# Patient Record
Sex: MALE | Race: WHITE | NOT HISPANIC OR LATINO
[De-identification: names, ages, dates, MRNs, and addresses within clinical notes are randomized per-mention and may not be internally consistent; named-entity substitution may affect disease eponyms.]

---

## 2017-09-12 ENCOUNTER — TRANSCRIPTION ENCOUNTER (OUTPATIENT)
Age: 52
End: 2017-09-12

## 2021-03-18 ENCOUNTER — OUTPATIENT (OUTPATIENT)
Dept: OUTPATIENT SERVICES | Facility: HOSPITAL | Age: 56
LOS: 1 days | End: 2021-03-18
Payer: COMMERCIAL

## 2021-03-18 DIAGNOSIS — Z20.828 CONTACT WITH AND (SUSPECTED) EXPOSURE TO OTHER VIRAL COMMUNICABLE DISEASES: ICD-10-CM

## 2021-03-18 LAB — SARS-COV-2 RNA SPEC QL NAA+PROBE: SIGNIFICANT CHANGE UP

## 2021-03-18 PROCEDURE — U0005: CPT

## 2021-03-18 PROCEDURE — C9803: CPT

## 2021-03-18 PROCEDURE — U0003: CPT

## 2021-03-19 DIAGNOSIS — Z20.828 CONTACT WITH AND (SUSPECTED) EXPOSURE TO OTHER VIRAL COMMUNICABLE DISEASES: ICD-10-CM

## 2021-10-15 ENCOUNTER — OUTPATIENT (OUTPATIENT)
Dept: OUTPATIENT SERVICES | Facility: HOSPITAL | Age: 56
LOS: 1 days | End: 2021-10-15
Payer: COMMERCIAL

## 2021-10-15 PROCEDURE — 93010 ELECTROCARDIOGRAM REPORT: CPT

## 2021-10-20 DIAGNOSIS — Z01.818 ENCOUNTER FOR OTHER PREPROCEDURAL EXAMINATION: ICD-10-CM

## 2021-10-20 PROBLEM — Z00.00 ENCOUNTER FOR PREVENTIVE HEALTH EXAMINATION: Status: ACTIVE | Noted: 2021-10-20

## 2021-10-22 ENCOUNTER — APPOINTMENT (OUTPATIENT)
Dept: DISASTER EMERGENCY | Facility: CLINIC | Age: 56
End: 2021-10-22

## 2021-10-23 LAB — SARS-COV-2 N GENE NPH QL NAA+PROBE: NOT DETECTED

## 2021-10-25 ENCOUNTER — OUTPATIENT (OUTPATIENT)
Dept: OUTPATIENT SERVICES | Facility: HOSPITAL | Age: 56
LOS: 1 days | End: 2021-10-25

## 2021-10-26 ENCOUNTER — OUTPATIENT (OUTPATIENT)
Dept: OUTPATIENT SERVICES | Facility: HOSPITAL | Age: 56
LOS: 1 days | End: 2021-10-26

## 2022-02-04 DIAGNOSIS — Z96.641 PRESENCE OF RIGHT ARTIFICIAL HIP JOINT: ICD-10-CM

## 2022-02-04 DIAGNOSIS — K44.9 DIAPHRAGMATIC HERNIA WITHOUT OBSTRUCTION OR GANGRENE: ICD-10-CM

## 2023-08-03 ENCOUNTER — EMERGENCY (EMERGENCY)
Facility: HOSPITAL | Age: 58
LOS: 1 days | Discharge: HOME | End: 2023-08-03
Attending: STUDENT IN AN ORGANIZED HEALTH CARE EDUCATION/TRAINING PROGRAM
Payer: COMMERCIAL

## 2023-08-03 ENCOUNTER — APPOINTMENT (OUTPATIENT)
Dept: ORTHOPEDIC SURGERY | Facility: CLINIC | Age: 58
End: 2023-08-03
Payer: COMMERCIAL

## 2023-08-03 ENCOUNTER — APPOINTMENT (OUTPATIENT)
Dept: MRI IMAGING | Facility: CLINIC | Age: 58
End: 2023-08-03
Payer: COMMERCIAL

## 2023-08-03 VITALS — HEIGHT: 76 IN | BODY MASS INDEX: 27.52 KG/M2 | WEIGHT: 226 LBS

## 2023-08-03 VITALS
WEIGHT: 227.96 LBS | HEART RATE: 79 BPM | TEMPERATURE: 98 F | HEIGHT: 76 IN | SYSTOLIC BLOOD PRESSURE: 124 MMHG | RESPIRATION RATE: 18 BRPM | DIASTOLIC BLOOD PRESSURE: 74 MMHG | OXYGEN SATURATION: 95 %

## 2023-08-03 DIAGNOSIS — M79.18 MYALGIA, OTHER SITE: ICD-10-CM

## 2023-08-03 DIAGNOSIS — Z87.19 PERSONAL HISTORY OF OTHER DISEASES OF THE DIGESTIVE SYSTEM: ICD-10-CM

## 2023-08-03 PROCEDURE — 73221 MRI JOINT UPR EXTREM W/O DYE: CPT | Mod: RT

## 2023-08-03 PROCEDURE — 99283 EMERGENCY DEPT VISIT LOW MDM: CPT

## 2023-08-03 PROCEDURE — 99204 OFFICE O/P NEW MOD 45 MIN: CPT

## 2023-08-03 PROCEDURE — 73080 X-RAY EXAM OF ELBOW: CPT | Mod: RT

## 2023-08-03 PROCEDURE — 99282 EMERGENCY DEPT VISIT SF MDM: CPT

## 2023-08-03 RX ORDER — NAPROXEN 500 MG/1
500 TABLET ORAL TWICE DAILY
Qty: 60 | Refills: 0 | Status: ACTIVE | COMMUNITY
Start: 2023-08-03 | End: 1900-01-01

## 2023-08-03 NOTE — ED PROVIDER NOTE - ATTENDING APP SHARED VISIT CONTRIBUTION OF CARE
[facility] Emergency Department  [address], [place] [address]  [phone]     Clinical Summary     PERSON INFORMATION   Name DAVID TRONCOSO Age  56 Years  1961 12:00 AM   Acct# [id number] Sex Male Phone [phone]   Dispo Type Still a patient - 30 Arrival 10/30/2017 8:33 PM Checkout 10/30/2017 10:59 PM    Address: [address] [place] [id number]      Visit Reason CHEST PAIN     ED Physician Note     ED Time Seen By Provider Entered On:  10/30/2017 20:42     Performed On:  10/30/2017 20:42  by Mir Mcnair MD      Time Seen By Provider   Time Seen by Provider :   10/30/2017 20:42    Mir Mcnair MD - 10/30/2017 20:42           VITALS INFORMATION  Vitals/Ht/Wt  Temperature Oral:  97.5 F  Peripheral Pulse Rate:  80 bpm  Respiratory Rate:  20 br/min  Oxygen Saturation:  98 %  Systolic Blood Pressure:  106 mmHg  Diastolic Blood Pressure:  70 mmHg  Mean Arterial Pressure:  82 mmHg  Height:  180 cm  Weight:  95.7 kg  ED Hand-Off Communication  ED Hand-Off Reason:  In Hospital Transfer  ED Hand-Off Reason / In Hospital:  SBAR reviewed during report  Patient on Cardiac Monitor:  Yes  Sitter Present:  No  Cardiac Monitor Box:  [id number]  Potential Core Measure:  N/A  Hand-off Report Given to:  Jono Sharma       MEDICAL INFORMATION   Allergy Info:          NKA             Prescriptions:              DISCHARGE INFORMATION   Discharge Disposition: Still a patient - 30     PATIENT EDUCATION INFORMATION   Instructions:          Follow up:              DIAGNOSIS           59 yo male PMHx Kang's esophagus presents to ED c/o right bicep tear x2 nights ago, referred here by a PMD who wanted MR and emergency surgery for patient 2 days s/p injury; given sling and orthopedics referral given there is no indication for emergency MR or surgery.

## 2023-08-03 NOTE — HISTORY OF PRESENT ILLNESS
[de-identified] : 58 year old male  (RHD ) right bicep injury 8/1/23 while playing volleyball. Pt went to [facility] and [facility] 8/3/23  The pain is located deep anterior elbow The pain is associated with throbbing  Worse with activity and better at rest. Has tried sling

## 2023-08-03 NOTE — ED PROVIDER NOTE - CLINICAL SUMMARY MEDICAL DECISION MAKING FREE TEXT BOX
57 yo male PMHx Kang's esophagus presents to ED c/o right bicep tear x2 nights ago. Sent in by PMD. No obvious biceps bulge. NVIT. Sling applied. Clinical coordinator contacted. 59 yo male PMHx Kang's esophagus presents to ED c/o right bicep tear x2 nights ago. Sent in by PMD. No obvious biceps bulge. NVIT. Sling applied. Declined pain medication. Clinical coordinator contacted. Medically stable for discharge.

## 2023-08-03 NOTE — ASSESSMENT
[FreeTextEntry1] : Right  X-Ray Examination of the ELBOW (3 views): there are no fractures, subluxations or dislocations.  Due to the patients injury along with weakness and exam consistent with distal bicep tear , we will get an mri to eval for integrity of the distal bicep tendon   - The patient was advised of the diagnosis.  The natural history of the pathology was explained to the patient in layman's terms.  Several different treatment options were discussed and explained including the risks and benefits of both surgical and non-surgical treatments. - The patient was advised to apply ice (wrapped in a towel or protective covering) to the area daily (20 minutes at a time, 2-4X/day). - Naprosyn rx - Patient was given a prescription for an anti-inflammatory medication.  They will take it for the next week and then on an as needed basis, as long as there are no medical contra-indications.  Patient is counseled on possible GI, renal, and cardiovascular side effects. - The patient was advised to modify their activities. - follow up with elbow specialist

## 2023-08-03 NOTE — ED PROVIDER NOTE - CARE PROVIDER_API CALL
Aidee Parker  Orthopaedic Surgery  [address] [place] [address]  Phone: [phone]  Fax: [phone]  Follow Up Time:

## 2023-08-03 NOTE — ED PROVIDER NOTE - OBJECTIVE STATEMENT
59 yo male PMHx Kang's esophagus presents to ED c/o right bicep tear x2 nights ago. Presented to PMD today who instructed patient to present to ED for surgery. [place]. Has Rx given by PMD for MRI. Did not self medicate PTA. No further complaints at this time. 59 yo male PMHx Kang's esophagus presents to ED c/o right bicep tear x2 nights ago. Felt tear while playing volleyball. Presented to PMD today who instructed patient to present to ED for surgery. Patient leaves for [place] country next week. Has Rx given by PMD for MRI. Did not self medicate PTA. No further complaints at this time.

## 2023-08-03 NOTE — ED PROVIDER NOTE - PHYSICAL EXAMINATION
Gen: Nontoxic, well appearing, in NAD.  Skin: Warm and dry as visualized.  Head: NC/AT.  Eyes: PERRLA. EOMI.  Neck: Supple, FROM. Trachea midline.   Resp: No distress.  Cardio: Well perfused.  PV: 2+ radial pulses.   Ext: No deformities. No bicep bulge. Can flex and extend and elbow.   Neuro: A&Ox3. Sensation intact.   Psych: Normal affect and mood.

## 2023-08-03 NOTE — ED PROVIDER NOTE - NSFOLLOWUPINSTRUCTIONS_ED_ALL_ED_FT
- Ibuprofen 600mg every 6 hours as needed for pain.  - Acetaminophen 650mg every 6 hours as needed for pain.   - Please bring all documentation from your ED visit to any related future follow up appointment.  - Please call to schedule follow up appointment with your primary care physician within 24-48 hours.  - Please seek immediate medical attention or return to the ED for any new/worsening, signs/symptoms, or concerns.    Feel better!     Distal Biceps Tendon Tear       The distal biceps tendon is a strong cord of tissue that connects the biceps muscle—which is the muscle on the front of the upper arm—to a bone (radius) in the elbow. Distal biceps tendon tear is an injury that may include either of the following:    A complete tear (rupture) in the tendon, causing the tendon to completely separate from the radius. When this happens, the biceps muscle pulls up (retracts) toward the shoulder.  A partial tear in the tendon that causes the tendon to partially separate from the radius.    This condition can interfere with your ability to turn your hand palm-up (supination) and bend (flex) your elbow. It is often treated with surgery, and recovery may take 4–8 months.    What are the causes?  This condition is usually caused by suddenly straightening the elbow while the biceps muscle is tightened (contracted). This could happen, for example, while lifting or carrying a heavy object that suddenly falls or shifts position.    What increases the risk?  The following factors may make you more likely to develop this condition:    Being a man who is 30–50 years old.  Smoking.  Using steroids.    What are the signs or symptoms?  Symptoms of this condition may include:    A feeling like a snap or a pop in the elbow at the time of injury.  Pain, swelling, and bruising in the front of the elbow.  Weakness in the arm when doing certain movements, such as:    Lifting or carrying objects.  Rotating the forearm, such as when you turn a screwdriver.  Bending the elbow.  A bulge in the upper arm. You may feel this in the front of the arm, the inside of the arm, or both.  Limited range of motion of the elbow and forearm.    How is this diagnosed?  This condition may be diagnosed based on:    Your symptoms and medical history.  A physical exam. Your health care provider may test your range of motion by having you do arm movements.  Tests, such as:    X-rays.  MRI.  Ultrasound.    How is this treated?  Treatment for this condition may include:    Medicines to help relieve pain and inflammation.  A splint or brace to immobilize your elbow.  Wearing a sling to help rest your arm.  Resting from sports and intense physical activity.  Surgery to reattach your tendon to your radius. This is the most common treatment.  Physical therapy.    Follow these instructions at home:      If you have a splint, brace, or sling:    Wear the splint, brace, or sling as told by your health care provider. Remove it only as told by your health care provider.  Loosen the splint, brace, or sling if your fingers tingle, become numb, or turn cold and blue.  Keep the splint, brace, or sling clean and dry.        Bathing    Do not take baths, swim, or use a hot tub until your health care provider approves. Ask your health care provider if you may take showers. You may only be allowed to take sponge baths.  If you have a splint, brace, or sling that is not waterproof:    Do not let it get wet.  Cover it with a watertight covering when you take a bath or shower.        Managing pain, stiffness, and swelling      If directed, put ice on the injured area:    Put ice in a plastic bag.  Place a towel between your skin and the bag.  Leave the ice on for 20 minutes, 2–3 times a day.  If directed, apply heat to the affected area before you exercise or as often as told by your health care provider. Use the heat source that your health care provider recommends, such as a moist heat pack or a heating pad.    Place a towel between your skin and the heat source.  Leave the heat on for 20–30 minutes.  Remove the heat if your skin turns bright red. This is especially important if you are unable to feel pain, heat, or cold. You may have a greater risk of getting burned.  Move your fingers often to avoid stiffness and to lessen swelling.  Raise (elevate) the injured area above the level of your heart while you are sitting or lying down.        Activity    Return to your normal activities as told by your health care provider. Ask your health care provider what activities are safe for you.  Until your health care provider approves:    Do not lift or carry anything with your injured arm.  Do not use the affected limb to support your body weight.  Do not participate in contact sports.  Avoid activities that cause pain or make your condition worse.  Do exercises as told by your physical therapist or health care provider.        General instructions    Take over-the-counter and prescription medicines only as told by your health care provider.  If you have a splint, do not put pressure on any part of the splint until it is fully hardened. This may take several hours.  Ask your health care provider when it is safe to drive if you have a splint, brace, or sling on your arm.  Do not use any products that contain nicotine or tobacco, such as cigarettes, e-cigarettes, and chewing tobacco. If you need help quitting, ask your health care provider.  Keep all follow-up visits as told by your health care provider. This is important.    Contact a health care provider if:  Your splint or brace causes pain or numbness.    Get help right away if:  You develop severe pain.  You develop numbness or tingling in your hand.  Your hand feels unusually cold.  Your fingernails turn a dark color, such as blue or gray.    Summary  Distal biceps tendon tear is an injury that may involve a complete or partial tear of the tendon.  This condition is usually caused by suddenly straightening the elbow while the biceps muscle is tightened.  Treatment may include medicines, rest, physical therapy, immobilization, or surgery.    ADDITIONAL NOTES AND INSTRUCTIONS    Please follow up with your Primary MD in 24-48 hr.  Seek immediate medical care for any new/worsening signs or symptoms.       Proximal Biceps Tendon Tear       The proximal biceps tendon is a strong cord of tissue that connects the biceps muscle—which is the muscle on the front of the upper arm—to the shoulder blade. A proximal biceps tendon tear (rupture) can include a partial or complete tear of the tendon near where it connects to the bone of the shoulder.    This injury can interfere with your ability to lift your arm in front of your body, stabilize your shoulder, bend your elbow, and turn your hand palm-up (supination).    What are the causes?  This condition happens when too much force is placed on the tendon. This excess force may be caused by:    The elbow being suddenly straightened from a bent position because of an external force. This could happen, for example, while catching a heavy weight or being pulled when waterskiing.  Wear and tear from physical activity.  Breaking a fall with your hand.    What increases the risk?  The following factors may make you more likely to develop this condition:    Playing contact sports.  Doing activities or sports that involve throwing or overhead movements, such as racket sports, gymnastics, or baseball.  Doing activities or sports that involve putting sudden force on the arm, such as weight lifting or waterskiing.  Having a weakened tendon because of:    Long-lasting (chronic) biceps tendinitis.  Certain medical conditions, such as diabetes or rheumatoid arthritis.  Repeated corticosteroid use.  Repetitive overhead movements.    What are the signs or symptoms?  Symptoms of this condition may include:    Sudden sharp pain in the front of the shoulder. Pain may get worse during certain movements, such as:    Lifting or carrying objects.  Straightening the elbow.  Throwing or using overhead movements.  Inflammation or a feeling of unusual warmth on the front of the shoulder.  Painful tightening (spasm) of the biceps muscle.  A bulge on the inside of the upper arm when the elbow is bent.  Bruising in the shoulder or upper arm. This may develop 24–48 hours after the tendon is injured.  Limited range of motion of the shoulder and elbow.  Weakness in the elbow and forearm when:    Bending the elbow.  Rotating the wrist.    How is this diagnosed?  This condition may be diagnosed based on:    Your symptoms and medical history.  A physical exam. Your health care provider may test the strength and range of motion of your shoulder and elbow.  Imaging tests, such as:    X-rays.  MRI.  Ultrasound.    How is this treated?  Treatment depends on the severity of the condition. Treatment may include:    Medicines to help relieve pain and inflammation.  Resting and icing the injured area.  Avoiding certain activities that put stress on your shoulder.  Physical therapy.  Surgery to repair the tear. This may be needed if nonsurgical treatments do not improve your condition.  One or more injections of medicines (corticosteroids) into your upper arm to help reduce inflammation. This treatment is rare.    Follow these instructions at home:      Managing pain, stiffness, and swelling      If directed, put ice on the injured area:    Put ice in a plastic bag.  Place a towel between your skin and the bag.  Leave the ice on for 20 minutes, 2–3 times a day.  If directed, apply heat to the affected area before you exercise or as often as told by your health care provider. Use the heat source that your health care provider recommends, such as a moist heat pack or a heating pad.    Place a towel between your skin and the heat source.  Leave the heat on for 20–30 minutes.  Remove the heat if your skin turns bright red. This is especially important if you are unable to feel pain, heat, or cold. You may have a greater risk of getting burned.  Move your fingers often to avoid stiffness and to lessen swelling.  Raise (elevate) the injured area while you are sitting or lying down.        Activity    Return to your normal activities as told by your health care provider. Ask your health care provider what activities are safe for you.  Avoid activities that cause pain or make your condition worse.  Do not lift anything that is heavier than 10 lb (4.5 kg), or the limit that you are told, until your health care provider says that it is safe.  Do exercises as told by your physical therapist or health care provider.        General instructions    Take over-the-counter and prescription medicines only as told by your health care provider.  Do not use any products that contain nicotine or tobacco, such as cigarettes and e-cigarettes. If you need help quitting, ask your health care provider.  Keep all follow-up visits as told by your health care provider. This is important.    How is this prevented?  Take these steps to help prevent reinjury:    Warm up and stretch before being active.  Cool down and stretch after being active.  Give your body time to rest between periods of activity.  Make sure you use equipment that fits you.  Be safe and responsible while being active. This will help you avoid falls.  Maintain physical fitness, including strength and flexibility.    Contact a health care provider if:  You have symptoms that get worse or do not get better after 2 weeks of treatment.  You develop new symptoms.    Get help right away if:  You have severe pain.  You develop pain or numbness in your hand.  Your hand feels unusually cold.  Your fingernails turn a dark color, such as blue or gray.    Summary  A proximal biceps tendon tear can include a partial or complete tear of the tendon near where it connects to the bone of the shoulder.  This condition happens when too much force is placed on the tendon.  Treatment may include resting and icing the injured area.  Avoid activities that cause pain or make your condition worse.    ADDITIONAL NOTES AND INSTRUCTIONS    Please follow up with your Primary MD in 24-48 hr.  Seek immediate medical care for any new/worsening signs or symptoms.

## 2023-08-03 NOTE — IMAGING
[de-identified] :  RIGHT ELBOW Inspection: Swelling Palpation: Tenderness anterior and unable to palpate distal biceps Range of Motion: Full elbow flexion and elbow extension.  Strength: flexion strength 3/5, extension strength 5/5 Neurological: motor exam 5/5 and light touch intact.  Ligament Stability and Special Testing: No varus or valgus instability

## 2023-08-03 NOTE — ED PROVIDER NOTE - PATIENT PORTAL LINK FT
You can access the FollowMyHealth Patient Portal offered by [facility] by registering at the following website: [email or web address]. By joining [facility]’s FollowMyHealth portal, you will also be able to view your health information using other applications (apps) compatible with our system.

## 2023-08-03 NOTE — ED PROVIDER NOTE - NS ED ATTENDING STATEMENT MOD
This was a shared visit with the JOSÉ MIGUEL. I reviewed and verified the documentation and independently performed the documented:

## 2023-08-04 ENCOUNTER — APPOINTMENT (OUTPATIENT)
Dept: ORTHOPEDIC SURGERY | Facility: CLINIC | Age: 58
End: 2023-08-04
Payer: COMMERCIAL

## 2023-08-04 ENCOUNTER — APPOINTMENT (OUTPATIENT)
Dept: ORTHOPEDIC SURGERY | Facility: CLINIC | Age: 58
End: 2023-08-04

## 2023-08-04 VITALS — WEIGHT: 226 LBS | BODY MASS INDEX: 27.52 KG/M2 | HEIGHT: 76 IN

## 2023-08-04 DIAGNOSIS — S46.211A STRAIN OF MUSCLE, FASCIA AND TENDON OF OTHER PARTS OF BICEPS, RIGHT ARM, INITIAL ENCOUNTER: ICD-10-CM

## 2023-08-04 PROCEDURE — 99244 OFF/OP CNSLTJ NEW/EST MOD 40: CPT

## 2023-08-04 NOTE — ASSESSMENT
[FreeTextEntry1] : Right partial distal biceps tear. MRI reviewed: partial tearing of distal biceps with inflammation. Discussed conservative vs. surgical treatments. Activity modification. Ice. Has Naproxen rx from Dr. Norwood. Recommend PT (patient is traveling soon, so will start when he returns). Return in 6 weeks.

## 2023-08-04 NOTE — PHYSICAL EXAM
[Right] : right elbow [] : palpable distal biceps [5___] : supination 5[unfilled]/5 [FreeTextEntry3] : resolving [FreeTextEntry9] : [id number]

## 2023-08-04 NOTE — DATA REVIEWED
[MRI] : MRI [Right] : of the right [Elbow] : elbow [I independently reviewed and interpreted images and report] : I independently reviewed and interpreted images and report [FreeTextEntry1] : partial tearing of distal biceps with inflammation

## 2023-08-04 NOTE — HISTORY OF PRESENT ILLNESS
[10] : 10 [6] : 6 [Dull/Aching] : dull/aching [Sharp] : sharp [Stabbing] : stabbing [de-identified] : 8/4/23: 59 y/o RHD male presents for a new consult from Dr. Norwood for evaluation of right arm injury on 8/1/23 playing volleyball. He did initially feel a "pop" in the elbow 3 weeks ago while reaching to push a box. He was seen initially by his PCP and at [facility]. He saw Dr. Norwood yesterday, who ordered MRI.  MRI right elbow: 1. Findings consistent with moderate grade partial tearing and delamination involving the distal 2-3 cm of the biceps tendon insertion with surrounding soft tissue swelling and bursitis suggesting recent traumatic injury without full-thickness discontinuity or retraction. Clinical correlation and follow up is recommended. 2. Lateral epicondylitis with partial tearing measuring 6 mm at the common extensor tendon insertion where there is slight delamination and tiny subcortical cysts without full-thickness discontinuity or retraction. 3. No muscle atrophy. 4. Clinical correlation and follow up is recommended.  [] : Post Surgical Visit: no [FreeTextEntry1] : right elbow  [de-identified] : none

## 2023-09-13 ENCOUNTER — APPOINTMENT (OUTPATIENT)
Dept: ORTHOPEDIC SURGERY | Facility: CLINIC | Age: 58
End: 2023-09-13
Payer: COMMERCIAL

## 2023-09-13 VITALS — WEIGHT: 226 LBS | HEIGHT: 76 IN | BODY MASS INDEX: 27.52 KG/M2

## 2023-09-13 VITALS — WEIGHT: 226 LBS | BODY MASS INDEX: 27.52 KG/M2 | HEIGHT: 76 IN

## 2023-09-13 DIAGNOSIS — S46.211D STRAIN OF MUSCLE, FASCIA AND TENDON OF OTHER PARTS OF BICEPS, RIGHT ARM, SUBSEQUENT ENCOUNTER: ICD-10-CM

## 2023-09-13 PROCEDURE — 99213 OFFICE O/P EST LOW 20 MIN: CPT

## 2024-03-27 NOTE — ED PROVIDER NOTE - PROVIDER TOKENS
"PROVIDER:[TOKEN:[[id number]]]

## 2024-08-06 ENCOUNTER — APPOINTMENT (OUTPATIENT)
Dept: PULMONOLOGY | Facility: CLINIC | Age: 59
End: 2024-08-06

## 2024-10-01 ENCOUNTER — APPOINTMENT (OUTPATIENT)
Dept: PULMONOLOGY | Facility: CLINIC | Age: 59
End: 2024-10-01
Payer: COMMERCIAL

## 2024-10-01 VITALS
HEART RATE: 73 BPM | OXYGEN SATURATION: 96 % | DIASTOLIC BLOOD PRESSURE: 64 MMHG | SYSTOLIC BLOOD PRESSURE: 116 MMHG | BODY MASS INDEX: 29.26 KG/M2 | HEIGHT: 74 IN | WEIGHT: 228 LBS | RESPIRATION RATE: 16 BRPM

## 2024-10-01 DIAGNOSIS — R05.3 CHRONIC COUGH: ICD-10-CM

## 2024-10-01 DIAGNOSIS — Z82.49 FAMILY HISTORY OF ISCHEMIC HEART DISEASE AND OTHER DISEASES OF THE CIRCULATORY SYSTEM: ICD-10-CM

## 2024-10-01 PROCEDURE — 99204 OFFICE O/P NEW MOD 45 MIN: CPT | Mod: 25

## 2024-10-01 PROCEDURE — 94010 BREATHING CAPACITY TEST: CPT

## 2024-10-01 RX ORDER — ALBUTEROL 90 MCG
AEROSOL (GRAM) INHALATION
Refills: 0 | Status: ACTIVE | COMMUNITY

## 2024-10-01 RX ORDER — ATORVASTATIN CALCIUM 80 MG/1
TABLET, FILM COATED ORAL
Refills: 0 | Status: ACTIVE | COMMUNITY

## 2024-10-01 NOTE — PROCEDURE
[FreeTextEntry1] : Minute nodule RML, on CTA, no PE 7/24 mild airways thickening spirometry is normal, normal F/V loop

## 2024-10-01 NOTE — DISCUSSION/SUMMARY
[FreeTextEntry1] : Post Viral cough, complicated by chronic, now recurrent Laryngitis episodes Never smoker, no classic asthma triggers, i.e. 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 LPR with vocal cord spasm Doubt asthma given above Needs ENT eval Nocturnal GERD precautions Trial of non sedating anti histamine Nodule follow up 6-12 months to call if no improvement

## 2024-10-01 NOTE — CONSULT LETTER
[Dear  ___] : Dear  [unfilled], [Consult Letter:] : I had the pleasure of evaluating your patient, [unfilled]. [Please see my note below.] : Please see my note below. [Sincerely,] : Sincerely, [FreeTextEntry3] : Leandro Oliveira DO [facility] Director Pulmonary Division Medical Director Respiratory Therapy [facility]
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
## 2024-10-01 NOTE — PHYSICAL EXAM
[No Acute Distress] : no acute distress [Normal Appearance] : normal appearance [Normal Rate/Rhythm] : normal rate/rhythm [Normal S1, S2] : normal s1, s2 [No Murmurs] : no murmurs [No Rubs] : no rubs [No Resp Distress] : no resp distress [Normal Palpation] : normal palpation [Normal Rhythm and Effort] : normal rhythm and effort [Clear to Auscultation Bilaterally] : clear to auscultation bilaterally [No Abnormalities] : no abnormalities [Normal Gait] : normal gait [No Clubbing] : no clubbing [No Cyanosis] : no cyanosis [No Edema] : no edema [FROM] : FROM [Normal Color/ Pigmentation] : normal color/ pigmentation [No Focal Deficits] : no focal deficits [Oriented x3] : oriented x3 [Normal Affect] : normal affect

## 2024-12-04 ENCOUNTER — NON-APPOINTMENT (OUTPATIENT)
Age: 59
End: 2024-12-04

## 2024-12-06 ENCOUNTER — APPOINTMENT (OUTPATIENT)
Dept: OTOLARYNGOLOGY | Facility: CLINIC | Age: 59
End: 2024-12-06
Payer: COMMERCIAL

## 2024-12-06 VITALS
HEART RATE: 69 BPM | BODY MASS INDEX: 29.28 KG/M2 | DIASTOLIC BLOOD PRESSURE: 78 MMHG | WEIGHT: 235.5 LBS | HEIGHT: 75 IN | SYSTOLIC BLOOD PRESSURE: 132 MMHG

## 2024-12-06 DIAGNOSIS — K21.9 GASTRO-ESOPHAGEAL REFLUX DISEASE W/OUT ESOPHAGITIS: ICD-10-CM

## 2024-12-06 DIAGNOSIS — R49.0 DYSPHONIA: ICD-10-CM

## 2024-12-06 PROCEDURE — 31231 NASAL ENDOSCOPY DX: CPT

## 2024-12-06 PROCEDURE — 99203 OFFICE O/P NEW LOW 30 MIN: CPT | Mod: 25

## 2025-03-17 ENCOUNTER — APPOINTMENT (OUTPATIENT)
Dept: PULMONOLOGY | Facility: CLINIC | Age: 60
End: 2025-03-17
Payer: COMMERCIAL

## 2025-03-17 VITALS
OXYGEN SATURATION: 96 % | SYSTOLIC BLOOD PRESSURE: 126 MMHG | BODY MASS INDEX: 29.22 KG/M2 | HEART RATE: 62 BPM | WEIGHT: 235 LBS | HEIGHT: 75 IN | RESPIRATION RATE: 16 BRPM | DIASTOLIC BLOOD PRESSURE: 68 MMHG

## 2025-03-17 DIAGNOSIS — R91.8 OTHER NONSPECIFIC ABNORMAL FINDING OF LUNG FIELD: ICD-10-CM

## 2025-03-17 DIAGNOSIS — K21.9 GASTRO-ESOPHAGEAL REFLUX DISEASE W/OUT ESOPHAGITIS: ICD-10-CM

## 2025-03-17 PROCEDURE — 99213 OFFICE O/P EST LOW 20 MIN: CPT

## 2025-03-17 PROCEDURE — G2211 COMPLEX E/M VISIT ADD ON: CPT | Mod: NC

## 2025-09-10 ENCOUNTER — APPOINTMENT (OUTPATIENT)
Dept: PULMONOLOGY | Facility: CLINIC | Age: 60
End: 2025-09-10
Payer: COMMERCIAL

## 2025-09-10 VITALS — HEART RATE: 62 BPM | DIASTOLIC BLOOD PRESSURE: 76 MMHG | OXYGEN SATURATION: 96 % | SYSTOLIC BLOOD PRESSURE: 116 MMHG

## 2025-09-10 VITALS — WEIGHT: 225 LBS | BODY MASS INDEX: 27.98 KG/M2 | RESPIRATION RATE: 16 BRPM | HEIGHT: 75 IN

## 2025-09-10 DIAGNOSIS — K21.9 GASTRO-ESOPHAGEAL REFLUX DISEASE W/OUT ESOPHAGITIS: ICD-10-CM

## 2025-09-10 DIAGNOSIS — R91.8 OTHER NONSPECIFIC ABNORMAL FINDING OF LUNG FIELD: ICD-10-CM

## 2025-09-10 PROCEDURE — 99213 OFFICE O/P EST LOW 20 MIN: CPT

## 2025-09-10 PROCEDURE — G2211 COMPLEX E/M VISIT ADD ON: CPT | Mod: NC

## 2025-09-10 RX ORDER — ATORVASTATIN CALCIUM 40 MG/1
40 TABLET, FILM COATED ORAL
Refills: 0 | Status: ACTIVE | COMMUNITY

## 2025-09-10 RX ORDER — FAMOTIDINE 40 MG/1
40 TABLET, FILM COATED ORAL
Refills: 0 | Status: ACTIVE | COMMUNITY

## 2025-09-10 RX ORDER — FAMOTIDINE 40 MG
TABLET,DISINTEGRATING ORAL
Refills: 0 | Status: ACTIVE | COMMUNITY

## 2025-09-10 RX ORDER — EZETIMIBE 10 MG/1
10 TABLET ORAL
Refills: 0 | Status: ACTIVE | COMMUNITY